# Patient Record
Sex: FEMALE | Race: WHITE | NOT HISPANIC OR LATINO | Employment: FULL TIME | URBAN - METROPOLITAN AREA
[De-identification: names, ages, dates, MRNs, and addresses within clinical notes are randomized per-mention and may not be internally consistent; named-entity substitution may affect disease eponyms.]

---

## 2018-10-24 ENCOUNTER — HOSPITAL ENCOUNTER (EMERGENCY)
Facility: HOSPITAL | Age: 43
Discharge: HOME | End: 2018-10-24
Attending: EMERGENCY MEDICINE | Admitting: EMERGENCY MEDICINE
Payer: COMMERCIAL

## 2018-10-24 VITALS
RESPIRATION RATE: 20 BRPM | BODY MASS INDEX: 19.63 KG/M2 | SYSTOLIC BLOOD PRESSURE: 141 MMHG | HEIGHT: 64 IN | OXYGEN SATURATION: 100 % | TEMPERATURE: 98.2 F | HEART RATE: 94 BPM | WEIGHT: 115 LBS | DIASTOLIC BLOOD PRESSURE: 70 MMHG

## 2018-10-24 DIAGNOSIS — N39.0 UTI (URINARY TRACT INFECTION): Primary | ICD-10-CM

## 2018-10-24 LAB
BACTERIA UR QL AUTO: ABNORMAL /HPF
BILIRUB UR QL STRIP: NEGATIVE
CLARITY UR: ABNORMAL
COLOR UR: YELLOW
EXT PREG TEST URINE: NEGATIVE
GLUCOSE UR STRIP-MCNC: NEGATIVE MG/DL
HGB UR QL STRIP.AUTO: ABNORMAL
KETONES UR STRIP-MCNC: NEGATIVE MG/DL
LEUKOCYTE ESTERASE UR QL STRIP: ABNORMAL
NITRITE UR QL STRIP: POSITIVE
NON-SQ EPI CELLS URNS QL MICRO: ABNORMAL /HPF
PH UR STRIP.AUTO: 6 [PH] (ref 4.5–8)
PROT UR STRIP-MCNC: ABNORMAL MG/DL
RBC #/AREA URNS AUTO: ABNORMAL /HPF
SP GR UR STRIP.AUTO: 1.01 (ref 1–1.03)
UROBILINOGEN UR QL STRIP.AUTO: 0.2 E.U./DL
WBC #/AREA URNS AUTO: ABNORMAL /HPF

## 2018-10-24 PROCEDURE — 87077 CULTURE AEROBIC IDENTIFY: CPT | Performed by: EMERGENCY MEDICINE

## 2018-10-24 PROCEDURE — 81001 URINALYSIS AUTO W/SCOPE: CPT | Performed by: EMERGENCY MEDICINE

## 2018-10-24 PROCEDURE — 87086 URINE CULTURE/COLONY COUNT: CPT | Performed by: EMERGENCY MEDICINE

## 2018-10-24 PROCEDURE — 87186 SC STD MICRODIL/AGAR DIL: CPT | Performed by: EMERGENCY MEDICINE

## 2018-10-24 PROCEDURE — 99283 EMERGENCY DEPT VISIT LOW MDM: CPT

## 2018-10-24 PROCEDURE — 81025 URINE PREGNANCY TEST: CPT | Performed by: EMERGENCY MEDICINE

## 2018-10-24 RX ORDER — PHENAZOPYRIDINE HYDROCHLORIDE 200 MG/1
200 TABLET, FILM COATED ORAL 3 TIMES DAILY
Qty: 6 TABLET | Refills: 0 | Status: SHIPPED | OUTPATIENT
Start: 2018-10-24

## 2018-10-24 RX ORDER — CIPROFLOXACIN 500 MG/1
500 TABLET, FILM COATED ORAL ONCE
Status: COMPLETED | OUTPATIENT
Start: 2018-10-24 | End: 2018-10-24

## 2018-10-24 RX ORDER — PHENAZOPYRIDINE HYDROCHLORIDE 100 MG/1
200 TABLET, FILM COATED ORAL ONCE
Status: COMPLETED | OUTPATIENT
Start: 2018-10-24 | End: 2018-10-24

## 2018-10-24 RX ORDER — CIPROFLOXACIN 500 MG/1
500 TABLET, FILM COATED ORAL 2 TIMES DAILY
Qty: 14 TABLET | Refills: 0 | Status: SHIPPED | OUTPATIENT
Start: 2018-10-24 | End: 2018-10-31

## 2018-10-24 RX ADMIN — CIPROFLOXACIN 500 MG: 500 TABLET, FILM COATED ORAL at 20:00

## 2018-10-24 RX ADMIN — PHENAZOPYRIDINE HYDROCHLORIDE 200 MG: 100 TABLET ORAL at 19:59

## 2018-10-24 NOTE — ED PROVIDER NOTES
History  Chief Complaint   Patient presents with    Possible UTI     Pt reports to ED w c/o dark urine and urgency that started yesterday  Healthy appearing adult in no distress  50-year-old female patient with history of lupus presents to the emergency department for evaluation of urinary frequency urgency and discomfort  Patient has never had a urinary tract infection her life  She has only taken two doses of methotrexate  Patient has no other symptoms  She has no systemic signs of infection  The patient be treated for urinary tract infection based on positive nitrites in her urinalysis        History provided by:  Patient   used: No    Urinary Frequency   Severity:  Mild  Onset quality:  Gradual  Timing:  Constant  Progression:  Worsening  Chronicity:  New  Associated symptoms: no chest pain, no diarrhea, no ear pain, no fever, no headaches and no sore throat        None       Past Medical History:   Diagnosis Date    Lupus        Past Surgical History:   Procedure Laterality Date     SECTION         History reviewed  No pertinent family history  I have reviewed and agree with the history as documented  Social History   Substance Use Topics    Smoking status: Never Smoker    Smokeless tobacco: Never Used    Alcohol use Yes      Comment: occasional         Review of Systems   Constitutional: Negative for fever  HENT: Negative for ear pain and sore throat  Cardiovascular: Negative for chest pain  Gastrointestinal: Negative for diarrhea  Genitourinary: Positive for frequency  Neurological: Negative for headaches  All other systems reviewed and are negative  Physical Exam  Physical Exam   Constitutional: She is oriented to person, place, and time  She appears well-developed and well-nourished  HENT:   Head: Normocephalic and atraumatic     Right Ear: External ear normal    Left Ear: External ear normal    Eyes: Conjunctivae and EOM are normal  Neck: No JVD present  No tracheal deviation present  No thyromegaly present  Cardiovascular: Normal rate  Pulmonary/Chest: Effort normal and breath sounds normal  No stridor  Abdominal: Soft  She exhibits no distension and no mass  There is no tenderness  There is no guarding  No hernia  Musculoskeletal: Normal range of motion  She exhibits no edema, tenderness or deformity  Lymphadenopathy:     She has no cervical adenopathy  Neurological: She is alert and oriented to person, place, and time  Skin: Skin is warm  No rash noted  No erythema  No pallor  Psychiatric: She has a normal mood and affect  Her behavior is normal    Nursing note and vitals reviewed        Vital Signs  ED Triage Vitals [10/24/18 1908]   Temperature Pulse Respirations Blood Pressure SpO2   98 2 °F (36 8 °C) 94 20 141/70 100 %      Temp Source Heart Rate Source Patient Position - Orthostatic VS BP Location FiO2 (%)   Oral Monitor Sitting Right arm --      Pain Score       --           Vitals:    10/24/18 1908   BP: 141/70   Pulse: 94   Patient Position - Orthostatic VS: Sitting       Visual Acuity      ED Medications  Medications   ciprofloxacin (CIPRO) tablet 500 mg (500 mg Oral Given 10/24/18 2000)   phenazopyridine (PYRIDIUM) tablet 200 mg (200 mg Oral Given 10/24/18 1959)       Diagnostic Studies  Results Reviewed     Procedure Component Value Units Date/Time    Urine culture [[id number]]  (Abnormal) Collected:  10/24/18 1936    Lab Status:  Preliminary result Specimen:  Urine from Urine, Clean Catch Updated:  10/25/18 1311     Urine Culture >100,000 cfu/ml Gram Negative Marvin Enteric Like (A)    Urine Microscopic [[id number]]  (Abnormal) Collected:  10/24/18 1936    Lab Status:  Final result Specimen:  Urine from Urine, Clean Catch Updated:  10/24/18 2018     RBC, UA 10-20 (A) /hpf      WBC, UA 30-50 (A) /hpf      Epithelial Cells Occasional /hpf      Bacteria, UA Innumerable (A) /hpf     UA w Reflex to Microscopic w Reflex to Culture [[id number]]  (Abnormal) Collected:  10/24/18 1936    Lab Status:  Final result Specimen:  Urine from Urine, Clean Catch Updated:  10/24/18 1946     Color, UA Yellow     Clarity, UA Cloudy     Specific [place], UA 1 010     pH, UA 6 0     Leukocytes, UA Moderate (A)     Nitrite, UA Positive (A)     Protein, UA Trace (A) mg/dl      Glucose, UA Negative mg/dl      Ketones, UA Negative mg/dl      Urobilinogen, UA 0 2 E U /dl      Bilirubin, UA Negative     Blood, UA Large (A)    POCT pregnancy, urine [[id number]]  (Normal) Resulted:  10/24/18 1940    Lab Status:  Final result Updated:  10/24/18 1940     EXT PREG TEST UR (Ref: Negative) Negative                 No orders to display              Procedures  Procedures       Phone Contacts  ED Phone Contact    ED Course                               MDM  Number of Diagnoses or Management Options  UTI (urinary tract infection): new and requires workup     Amount and/or Complexity of Data Reviewed  Clinical lab tests: ordered and reviewed  Decide to obtain previous medical records or to obtain history from someone other than the patient: yes  Review and summarize past medical records: yes    Patient Progress  Patient progress: stable    CritCare Time    Disposition  Final diagnoses:   UTI (urinary tract infection)     Time reflects when diagnosis was documented in both MDM as applicable and the Disposition within this note     Time User Action Codes Description Comment    10/24/2018  7:49 PM Vahid España Add [N39 0] UTI (urinary tract infection)       ED Disposition     ED Disposition Condition Comment    Discharge  Darlin Gray discharge to home/self care      Condition at discharge: Good        Follow-up Information     Follow up With Specialties Details Why Contact Info    Jesus Wolf MD Family Medicine  As needed [address] [facility] [address] [phone]            Discharge Medication List as of 10/24/2018  7:51 PM      START taking these medications    Details   ciprofloxacin (CIPRO) 500 mg tablet Take 1 tablet (500 mg total) by mouth 2 (two) times a day for 7 days, Starting Wed 10/24/2018, Until Wed 10/31/2018, Print      phenazopyridine (PYRIDIUM) 200 mg tablet Take 1 tablet (200 mg total) by mouth 3 (three) times a day, Starting Wed 10/24/2018, Print           No discharge procedures on file      ED Provider  Electronically Signed by           Kaiser Carballo DO  10/25/18 5186

## 2018-10-24 NOTE — DISCHARGE INSTRUCTIONS
Urinary Tract Infection in Women, Ambulatory Care   GENERAL INFORMATION:   A urinary tract infection (UTI)  is caused by bacteria that get inside your urinary tract  Most bacteria that enter your urinary tract are expelled when you urinate  If the bacteria stay in your urinary tract, you may get an infection  Your urinary tract includes your kidneys, ureters, bladder, and urethra  Urine is made in your kidneys, and it flows from the ureters to the bladder  Urine leaves the bladder through the urethra  A UTI is more common in your lower urinary tract, which includes your bladder and urethra  Common symptoms include the following:   · Urinating more often or waking from sleep to urinate    · Pain or burning when you urinate    · Pain or pressure in your lower abdomen     · Urine that smells bad    · Blood in your urine    · Leaking urine  Seek immediate care for the following symptoms:   · Urinating very little or not at all    · Vomiting    · Shaking chills with a fever    · Side or back pain that gets worse  Treatment for a UTI  may include medicines to treat a bacterial infection  You may also need medicines to decrease pain and burning, or decrease the urge to urinate often  Prevent a UTI:   · Urinate when you feel the urge  Do not hold your urine  Urinate as soon as you feel you have to  · Drink liquids as directed  Ask how much liquid to drink each day and which liquids are best for you  You may need to drink more fluids than usual to help flush out the bacteria  Do not drink alcohol, caffeine, and citrus juices  These can irritate your bladder and increase your symptoms  · Apply heat  on your abdomen for 20 to 30 minutes every 2 hours for as many days as directed  Heat helps decrease discomfort and pressure in your bladder  Follow up with your healthcare provider as directed:  Write down your questions so you remember to ask them during your visits     CARE AGREEMENT:   You have the right to help plan your care  Learn about your health condition and how it may be treated  Discuss treatment options with your caregivers to decide what care you want to receive  You always have the right to refuse treatment  The above information is an  only  It is not intended as medical advice for individual conditions or treatments  Talk to your doctor, nurse or pharmacist before following any medical regimen to see if it is safe and effective for you  © 2014 [address] is for End User's use only and may not be sold, redistributed or otherwise used for commercial purposes  All illustrations and images included in CareNotes® are the copyrighted property of [employer]  or Jesse Simms

## 2018-10-25 NOTE — ED NOTES
D/c reviewed with pt prior to discharge by provider  Ambulatory off unit with steady gait        Evangelist Hobbs RN  15/81/98 2003

## 2018-10-26 LAB — BACTERIA UR CULT: ABNORMAL

## 2023-03-12 DIAGNOSIS — Z30.41 ENCOUNTER FOR SURVEILLANCE OF CONTRACEPTIVE PILLS: ICD-10-CM

## 2023-06-10 RX ORDER — ACETAMINOPHEN AND CODEINE PHOSPHATE 120; 12 MG/5ML; MG/5ML
SOLUTION ORAL
Qty: 84 TABLET | Refills: 3 | OUTPATIENT
Start: 2023-06-10

## 2024-04-18 ENCOUNTER — HOSPITAL ENCOUNTER (EMERGENCY)
Facility: HOSPITAL | Age: 49
Discharge: HOME | End: 2024-04-18
Payer: COMMERCIAL

## 2024-04-18 VITALS
SYSTOLIC BLOOD PRESSURE: 133 MMHG | HEART RATE: 80 BPM | DIASTOLIC BLOOD PRESSURE: 79 MMHG | TEMPERATURE: 97.7 F | RESPIRATION RATE: 12 BRPM | OXYGEN SATURATION: 99 %

## 2024-04-18 DIAGNOSIS — S61.219A FINGER LACERATION: Primary | ICD-10-CM

## 2024-04-18 PROCEDURE — 99284 EMERGENCY DEPT VISIT MOD MDM: CPT | Performed by: PHYSICIAN ASSISTANT

## 2024-04-18 PROCEDURE — 99282 EMERGENCY DEPT VISIT SF MDM: CPT

## 2024-04-18 PROCEDURE — 12002 RPR S/N/AX/GEN/TRNK2.6-7.5CM: CPT | Performed by: PHYSICIAN ASSISTANT

## 2024-04-18 RX ORDER — LIDOCAINE HYDROCHLORIDE 10 MG/ML
10 INJECTION, SOLUTION EPIDURAL; INFILTRATION; INTRACAUDAL; PERINEURAL ONCE
Status: COMPLETED | OUTPATIENT
Start: 2024-04-18 | End: 2024-04-18

## 2024-04-18 RX ADMIN — LIDOCAINE HYDROCHLORIDE 10 ML: 10 INJECTION, SOLUTION EPIDURAL; INFILTRATION; INTRACAUDAL at 22:15

## 2024-04-19 NOTE — DISCHARGE INSTRUCTIONS
Return sooner to the Emergency Department if increased pain, fever, pus, redness, swelling, red streaks, vomiting, weakness, numbness, bleeding. Elevate. Keep wound dry for 2 days. Sutures/staples out in 12-14 days.

## 2024-04-19 NOTE — ED PROVIDER NOTES
History  Chief Complaint   Patient presents with    Laceration     Approximately 3cm cut into left index finger tip and nail from a steak knife. Bleeding controlled with dressing. Patient unsure if UTD with tetanus.      This is a 49-year-old female patient with a laceration to the left index finger.  She was using a new steak knife and accidentally caused a laceration through the distal aspect of the nail and then the distal pad of the left index finger.  Bleeding controlled with direct pressure.  Denies any numbness tingling or weakness.  Her tetanus is up-to-date.      History provided by:  Patient   used: No    Laceration  Associated symptoms: no fever and no rash        Prior to Admission Medications   Prescriptions Last Dose Informant Patient Reported? Taking?   phenazopyridine (PYRIDIUM) 200 mg tablet   No No   Sig: Take 1 tablet (200 mg total) by mouth 3 (three) times a day      Facility-Administered Medications: None       Past Medical History:   Diagnosis Date    Lupus (HCC)        Past Surgical History:   Procedure Laterality Date     SECTION         History reviewed. No pertinent family history.  I have reviewed and agree with the history as documented.    E-Cigarette/Vaping    E-Cigarette Use Never User      E-Cigarette/Vaping Substances     Social History     Tobacco Use    Smoking status: Never    Smokeless tobacco: Never   Vaping Use    Vaping status: Never Used   Substance Use Topics    Alcohol use: Yes     Comment: occasional     Drug use: No       Review of Systems   Constitutional:  Negative for chills and fever.   HENT:  Negative for ear pain and sore throat.    Eyes:  Negative for pain and visual disturbance.   Respiratory:  Negative for cough and shortness of breath.    Cardiovascular:  Negative for chest pain and palpitations.   Gastrointestinal:  Negative for abdominal pain and vomiting.   Genitourinary:  Negative for dysuria and hematuria.   Musculoskeletal:   Negative for arthralgias and back pain.   Skin:  Positive for wound. Negative for color change and rash.   Neurological:  Negative for seizures and syncope.   All other systems reviewed and are negative.      Physical Exam  Physical Exam  Vitals and nursing note reviewed.   Constitutional:       General: She is not in acute distress.     Appearance: She is well-developed.   HENT:      Head: Normocephalic and atraumatic.   Eyes:      Conjunctiva/sclera: Conjunctivae normal.   Cardiovascular:      Rate and Rhythm: Normal rate and regular rhythm.      Heart sounds: No murmur heard.  Pulmonary:      Effort: Pulmonary effort is normal. No respiratory distress.      Breath sounds: Normal breath sounds.   Abdominal:      Palpations: Abdomen is soft.      Tenderness: There is no abdominal tenderness.   Musculoskeletal:         General: No swelling.        Hands:       Cervical back: Neck supple.      Comments: No deep structure involvement of laceration   Skin:     General: Skin is warm and dry.      Capillary Refill: Capillary refill takes less than 2 seconds.   Neurological:      Mental Status: She is alert.   Psychiatric:         Mood and Affect: Mood normal.         Vital Signs  ED Triage Vitals [04/18/24 2151]   Temperature Pulse Respirations Blood Pressure SpO2   97.7 °F (36.5 °C) 80 12 133/79 99 %      Temp Source Heart Rate Source Patient Position - Orthostatic VS BP Location FiO2 (%)   Temporal Monitor Sitting Right arm --      Pain Score       --           Vitals:    04/18/24 2151   BP: 133/79   Pulse: 80   Patient Position - Orthostatic VS: Sitting         Visual Acuity      ED Medications  Medications   lidocaine (PF) (XYLOCAINE-MPF) 1 % injection 10 mL (10 mL Infiltration Given by Other 4/18/24 3765)       Diagnostic Studies  Results Reviewed       None                   No orders to display              Procedures  Universal Protocol:  Consent: Verbal consent obtained.  Consent given by: patient  Patient  understanding: patient states understanding of the procedure being performed  Patient identity confirmed: verbally with patient, arm band, provided demographic data and hospital-assigned identification number  Laceration repair    Date/Time: 4/18/2024 10:30 PM    Performed by: Meño Bills PA-C  Authorized by: Meño Bills PA-C  Anesthesia: local infiltration    Wound Dehiscence:  Superficial Wound Dehiscence: simple closure      Procedure Details:  Irrigation solution: saline  Irrigation method: syringe  Amount of cleaning: standard  Debridement: none  Degree of undermining: none  Number of sutures: 1  Technique: simple  Approximation: close  Approximation difficulty: simple               ED Course                               SBIRT 20yo+      Flowsheet Row Most Recent Value   Initial Alcohol Screen: US AUDIT-C     1. How often do you have a drink containing alcohol? 0 Filed at: 04/18/2024 2240   2. How many drinks containing alcohol do you have on a typical day you are drinking?  0 Filed at: 04/18/2024 2240   3b. FEMALE Any Age, or MALE 65+: How often do you have 4 or more drinks on one occassion? 0 Filed at: 04/18/2024 2240   Audit-C Score 0 Filed at: 04/18/2024 2240   TODD: How many times in the past year have you...    Used an illegal drug or used a prescription medication for non-medical reasons? Never Filed at: 04/18/2024 2240                      Medical Decision Making  Differential diagnosis includes but is not limited to laceration, abrasion, avulsion, doubt deep structure involvement.  Plan: Offered to glue the wound however patient would prefer stitches.  Explained that this would likely only require a single stitch.  She would like to proceed with a stitch and does not want glue.  MDM: 49-year-old laceration to the left index finger repaired with single stitch at patient's request.   pressure dressing applied.  She is not having any bleeding at this time.  Stable for outpatient follow-up.   Discussed wound care and return parameters.  Patient understands and agrees with this plan.    Risk  Prescription drug management.             Disposition  Final diagnoses:   Finger laceration - left index     Time reflects when diagnosis was documented in both MDM as applicable and the Disposition within this note       Time User Action Codes Description Comment    4/18/2024 10:38 PM Meño Bills Add [S61.219A] Finger laceration     4/18/2024 10:38 PM Meño Bills Modify [S61.219A] Finger laceration left index          ED Disposition       ED Disposition   Discharge    Condition   Stable    Date/Time   Thu Apr 18, 2024 2238    Comment   Pita Camilo discharge to home/self care.                   Follow-up Information       Follow up With Specialties Details Why Contact Info    Jem Oliveros MD Family Medicine   [address]  [address]  [place] [address] [phone]              Discharge Medication List as of 4/18/2024 10:38 PM        CONTINUE these medications which have NOT CHANGED    Details   phenazopyridine (PYRIDIUM) 200 mg tablet Take 1 tablet (200 mg total) by mouth 3 (three) times a day, Starting Wed 10/24/2018, Print             No discharge procedures on file.    PDMP Review       None            ED Provider  Electronically Signed by             Meño Bills PA-C  04/18/24 0346

## 2024-08-19 ENCOUNTER — APPOINTMENT (EMERGENCY)
Dept: RADIOLOGY | Facility: HOSPITAL | Age: 49
End: 2024-08-19
Payer: COMMERCIAL

## 2024-08-19 ENCOUNTER — HOSPITAL ENCOUNTER (EMERGENCY)
Facility: HOSPITAL | Age: 49
Discharge: HOME | End: 2024-08-19
Attending: EMERGENCY MEDICINE
Payer: COMMERCIAL

## 2024-08-19 VITALS
TEMPERATURE: 98.4 F | HEART RATE: 72 BPM | RESPIRATION RATE: 18 BRPM | WEIGHT: 124.12 LBS | OXYGEN SATURATION: 98 % | BODY MASS INDEX: 21.3 KG/M2 | SYSTOLIC BLOOD PRESSURE: 132 MMHG | DIASTOLIC BLOOD PRESSURE: 79 MMHG

## 2024-08-19 DIAGNOSIS — S92.919A TOE FRACTURE: Primary | ICD-10-CM

## 2024-08-19 PROCEDURE — 73660 X-RAY EXAM OF TOE(S): CPT

## 2024-08-19 PROCEDURE — 99284 EMERGENCY DEPT VISIT MOD MDM: CPT | Performed by: EMERGENCY MEDICINE

## 2024-08-19 PROCEDURE — 99283 EMERGENCY DEPT VISIT LOW MDM: CPT

## 2024-08-19 NOTE — ED PROVIDER NOTES
History  Chief Complaint   Patient presents with    Toe Injury     Pt presents to ER from home for reports of toe injury - stood up after being on floor and states toe went the wrong way, R foot 3rd digit, states it is numb now.      HPI patient is a 49-year-old female she reports she was getting up from the floor twisted her foot and felt pain in her right third toe.  She reports that she has a history of numbness in that foot due to some surgery.  She reports that she did not really feel like it was an abnormal movement but unfortunately the toe seems to be swollen at the base.  She reports pain and tenderness over the proximal and middle phalanx of her righ third toe. she denies any other injury.  Past medical history of foot surgery  Family history contributory  Social history, non-smoker no history of drug abuse    Prior to Admission Medications   Prescriptions Last Dose Informant Patient Reported? Taking?   phenazopyridine (PYRIDIUM) 200 mg tablet   No No   Sig: Take 1 tablet (200 mg total) by mouth 3 (three) times a day      Facility-Administered Medications: None       Past Medical History:   Diagnosis Date    Lupus (HCC)        Past Surgical History:   Procedure Laterality Date     SECTION      NEUROMA EXCISION Bilateral     2 on R, 1 on L       History reviewed. No pertinent family history.  I have reviewed and agree with the history as documented.    E-Cigarette/Vaping    E-Cigarette Use Never User      E-Cigarette/Vaping Substances     Social History     Tobacco Use    Smoking status: Never    Smokeless tobacco: Never   Vaping Use    Vaping status: Never Used   Substance Use Topics    Alcohol use: Yes     Comment: occasional     Drug use: No       Review of Systems   Neurological:  Negative for weakness and numbness.       Physical Exam  Physical Exam  Constitutional:       Appearance: Normal appearance.   Musculoskeletal:      Comments: There is some ecchymosis and tenderness of the proximal  middle phalanx of the right third toe, no lacerations, distal neurovascular tendon intact.  Patient reports some chronic numbness of the toe.   Skin:     General: Skin is warm.   Neurological:      Mental Status: She is alert.         Vital Signs  ED Triage Vitals [08/19/24 1121]   Temperature Pulse Respirations Blood Pressure SpO2   98.4 °F (36.9 °C) 72 18 132/79 98 %      Temp Source Heart Rate Source Patient Position - Orthostatic VS BP Location FiO2 (%)   Oral Monitor Sitting Left arm --      Pain Score       4           Vitals:    08/19/24 1121   BP: 132/79   Pulse: 72   Patient Position - Orthostatic VS: Sitting         Visual Acuity      ED Medications  Medications - No data to display    Diagnostic Studies  Results Reviewed       None                   XR toe third min 2 views RIGHT    (Results Pending)              Procedures  Procedures         ED Course       X-ray of the right third toe showed a fracture at the base of the proximal phalanx, interpreted by me, I was initial .                          SBIRT 22yo+      Flowsheet Row Most Recent Value   Initial Alcohol Screen: US AUDIT-C     1. How often do you have a drink containing alcohol? 0 Filed at: 08/19/2024 1124   2. How many drinks containing alcohol do you have on a typical day you are drinking?  0 Filed at: 08/19/2024 1124   3b. FEMALE Any Age, or MALE 65+: How often do you have 4 or more drinks on one occassion? 0 Filed at: 08/19/2024 1124   Audit-C Score 0 Filed at: 08/19/2024 1124   TODD: How many times in the past year have you...    Used an illegal drug or used a prescription medication for non-medical reasons? Never Filed at: 08/19/2024 1124                      Medical Decision Making  Decision making 49-year-old female presents emergency department after twisting it while standing her right foot and injuring her right third toe.  There was a fracture at the base of the toe on x-ray, discussed jaqueline austin discussed follow-up  discussed indications to return.    Amount and/or Complexity of Data Reviewed  Radiology: ordered.                 Disposition  Final diagnoses:   Toe fracture - Third toe     Time reflects when diagnosis was documented in both MDM as applicable and the Disposition within this note       Time User Action Codes Description Comment    8/19/2024 12:55 PM Cyrus Gusman Add [S92.919A] Toe fracture     8/19/2024 12:55 PM Cyrus Gusman Modify [S92.919A] Toe fracture Third toe          ED Disposition       ED Disposition   Discharge    Condition   Stable    Date/Time   Mon Aug 19, 2024 1255    Comment   Pita Page discharge to home/self care.                   Follow-up Information       Follow up With Specialties Details Why Contact Info    [facility]   [address] [facility]    [place] [address]  [phone]              Discharge Medication List as of 8/19/2024  1:37 PM        CONTINUE these medications which have NOT CHANGED    Details   phenazopyridine (PYRIDIUM) 200 mg tablet Take 1 tablet (200 mg total) by mouth 3 (three) times a day, Starting Wed 10/24/2018, Print             No discharge procedures on file.    PDMP Review       None            ED Provider  Electronically Signed by             Cyrus Gusman MD  08/19/24 5359